# Patient Record
Sex: MALE | Race: WHITE
[De-identification: names, ages, dates, MRNs, and addresses within clinical notes are randomized per-mention and may not be internally consistent; named-entity substitution may affect disease eponyms.]

---

## 2018-04-23 ENCOUNTER — HOSPITAL ENCOUNTER (EMERGENCY)
Dept: HOSPITAL 17 - PHED | Age: 63
Discharge: HOME | End: 2018-04-23
Payer: OTHER GOVERNMENT

## 2018-04-23 VITALS
HEART RATE: 55 BPM | RESPIRATION RATE: 18 BRPM | DIASTOLIC BLOOD PRESSURE: 84 MMHG | SYSTOLIC BLOOD PRESSURE: 156 MMHG | OXYGEN SATURATION: 97 %

## 2018-04-23 VITALS
TEMPERATURE: 97.6 F | DIASTOLIC BLOOD PRESSURE: 107 MMHG | HEART RATE: 66 BPM | RESPIRATION RATE: 16 BRPM | SYSTOLIC BLOOD PRESSURE: 205 MMHG | OXYGEN SATURATION: 98 %

## 2018-04-23 VITALS
HEART RATE: 84 BPM | DIASTOLIC BLOOD PRESSURE: 103 MMHG | RESPIRATION RATE: 18 BRPM | OXYGEN SATURATION: 97 % | SYSTOLIC BLOOD PRESSURE: 168 MMHG

## 2018-04-23 VITALS — HEIGHT: 66 IN | WEIGHT: 173.28 LBS | BODY MASS INDEX: 27.85 KG/M2

## 2018-04-23 VITALS
OXYGEN SATURATION: 97 % | RESPIRATION RATE: 18 BRPM | DIASTOLIC BLOOD PRESSURE: 82 MMHG | SYSTOLIC BLOOD PRESSURE: 158 MMHG | HEART RATE: 57 BPM

## 2018-04-23 DIAGNOSIS — Z79.899: ICD-10-CM

## 2018-04-23 DIAGNOSIS — R94.31: ICD-10-CM

## 2018-04-23 DIAGNOSIS — R00.1: ICD-10-CM

## 2018-04-23 DIAGNOSIS — Z72.0: ICD-10-CM

## 2018-04-23 DIAGNOSIS — R42: ICD-10-CM

## 2018-04-23 DIAGNOSIS — E78.5: ICD-10-CM

## 2018-04-23 DIAGNOSIS — I10: Primary | ICD-10-CM

## 2018-04-23 DIAGNOSIS — E78.00: ICD-10-CM

## 2018-04-23 LAB
BASOPHILS # BLD AUTO: 0.1 TH/MM3 (ref 0–0.2)
BASOPHILS NFR BLD: 0.9 % (ref 0–2)
BUN SERPL-MCNC: 12 MG/DL (ref 7–18)
CALCIUM SERPL-MCNC: 8.9 MG/DL (ref 8.5–10.1)
CHLORIDE SERPL-SCNC: 103 MEQ/L (ref 98–107)
CREAT SERPL-MCNC: 0.96 MG/DL (ref 0.6–1.3)
EOSINOPHIL # BLD: 0.1 TH/MM3 (ref 0–0.4)
EOSINOPHIL NFR BLD: 0.9 % (ref 0–4)
ERYTHROCYTE [DISTWIDTH] IN BLOOD BY AUTOMATED COUNT: 13.9 % (ref 11.6–17.2)
GFR SERPLBLD BASED ON 1.73 SQ M-ARVRAT: 79 ML/MIN (ref 89–?)
GLUCOSE SERPL-MCNC: 92 MG/DL (ref 74–106)
HCO3 BLD-SCNC: 28.7 MEQ/L (ref 21–32)
HCT VFR BLD CALC: 48.6 % (ref 39–51)
HGB BLD-MCNC: 15.8 GM/DL (ref 13–17)
INR PPP: 1 RATIO
LYMPHOCYTES # BLD AUTO: 2.1 TH/MM3 (ref 1–4.8)
LYMPHOCYTES NFR BLD AUTO: 26.2 % (ref 9–44)
MAGNESIUM SERPL-MCNC: 2.1 MG/DL (ref 1.5–2.5)
MCH RBC QN AUTO: 27.7 PG (ref 27–34)
MCHC RBC AUTO-ENTMCNC: 32.6 % (ref 32–36)
MCV RBC AUTO: 85 FL (ref 80–100)
MONOCYTE #: 0.6 TH/MM3 (ref 0–0.9)
MONOCYTES NFR BLD: 7.1 % (ref 0–8)
NEUTROPHILS # BLD AUTO: 5.2 TH/MM3 (ref 1.8–7.7)
NEUTROPHILS NFR BLD AUTO: 64.9 % (ref 16–70)
PLATELET # BLD: 229 TH/MM3 (ref 150–450)
PMV BLD AUTO: 6.9 FL (ref 7–11)
PROTHROMBIN TIME: 10.6 SEC (ref 9.8–11.6)
RBC # BLD AUTO: 5.72 MIL/MM3 (ref 4.5–5.9)
SODIUM SERPL-SCNC: 136 MEQ/L (ref 136–145)
TROPONIN I SERPL-MCNC: (no result) NG/ML (ref 0.02–0.05)
WBC # BLD AUTO: 8.1 TH/MM3 (ref 4–11)

## 2018-04-23 PROCEDURE — 83690 ASSAY OF LIPASE: CPT

## 2018-04-23 PROCEDURE — 85730 THROMBOPLASTIN TIME PARTIAL: CPT

## 2018-04-23 PROCEDURE — 71045 X-RAY EXAM CHEST 1 VIEW: CPT

## 2018-04-23 PROCEDURE — 99285 EMERGENCY DEPT VISIT HI MDM: CPT

## 2018-04-23 PROCEDURE — 96360 HYDRATION IV INFUSION INIT: CPT

## 2018-04-23 PROCEDURE — 85025 COMPLETE CBC W/AUTO DIFF WBC: CPT

## 2018-04-23 PROCEDURE — 82550 ASSAY OF CK (CPK): CPT

## 2018-04-23 PROCEDURE — 84484 ASSAY OF TROPONIN QUANT: CPT

## 2018-04-23 PROCEDURE — 85610 PROTHROMBIN TIME: CPT

## 2018-04-23 PROCEDURE — 83735 ASSAY OF MAGNESIUM: CPT

## 2018-04-23 PROCEDURE — 80048 BASIC METABOLIC PNL TOTAL CA: CPT

## 2018-04-23 PROCEDURE — 93005 ELECTROCARDIOGRAM TRACING: CPT

## 2018-04-23 PROCEDURE — 70450 CT HEAD/BRAIN W/O DYE: CPT

## 2018-04-23 PROCEDURE — 82552 ASSAY OF CPK IN BLOOD: CPT

## 2018-04-23 NOTE — EKG
Date Performed: 04/23/2018       Time Performed: 20:55:10

 

PTAGE:      63 years

 

EKG:      SINUS BRADYCARDIA WITH SHORT OH INTERVAL LEFT ATRIAL ENLARGEMENT BORDERLINE LEFT AXIS DEVIA
TION LOW QRS VOLTAGE IN EXTREMITY LEADS ABNORMAL ECG 

 

NO PREVIOUS TRACING            

 

DOCTOR:   Rudolph Peterson  Interpretating Date/Time  04/23/2018 21:49:26

## 2018-04-23 NOTE — RADRPT
EXAM DATE/TIME:  04/23/2018 22:07 

 

[facility] COMPARISON:     

No previous studies available for comparison.

 

 

INDICATIONS :     

Dizziness.

                      

 

RADIATION DOSE:     

50.69 CTDIvol (mGy) 

 

 

 

MEDICAL HISTORY :     

Hypertension.  

 

SURGICAL HISTORY :      

None. 

 

ENCOUNTER:      

Initial

 

ACUITY:      

1 day

 

PAIN SCALE:      

0/10

 

LOCATION:        

cranial 

 

TECHNIQUE:     

Multiple contiguous axial images were obtained of the head.  Using automated exposure control and adj
ustment of the mA and/or kV according to patient size, radiation dose was kept as low as reasonably a
chievable to obtain optimal diagnostic quality images.   DICOM format image data is available electro
nically for review and comparison.  

 

FINDINGS:     

 

CEREBRUM:     

The ventricles are normal for age.  No evidence of midline shift, mass lesion, hemorrhage or acute in
farction.  No extra-axial fluid collections are seen.

 

POSTERIOR FOSSA:     

The cerebellum and brainstem are intact.  The 4th ventricle is midline.  The cerebellopontine angle i
s unremarkable.

 

EXTRACRANIAL:     

The visualized portion of the orbits is intact.

 

SKULL:     

The calvaria is intact.  No evidence of skull fracture.

 

CONCLUSION:     

Negative noncontrast head CT.

 

 

 

 Johnny Pacheco MD on April 23, 2018 at 22:29           

Board Certified Radiologist.

 This report was verified electronically.

## 2018-04-23 NOTE — PD
HPI


Chief Complaint:  Hypertension


Time Seen by Provider:  20:03


Travel History


International Travel<30 days:  No


Contact w/Intl Traveler<30days:  No


Traveled to known affect area:  No





History of Present Illness


HPI


Patient is a 63-year-old male who presents the emergency room for evaluation of 

dizziness.  Patient reports that he has history of hypertension as well as 

hyperlipidemia, patient reports that he is here visiting from [place].  

Patient reports that he takes lisinopril/hydrochlorothiazide 5 mg daily, 

reports that he was lying down today and all of a sudden felt dizzy.  Patient 

checked his blood pressure and noticed that it was high, reports that it was 174

/107.  Patient reports that he thought that his symptoms were due to his blood 

pressure being elevated.  Patient reports that he has been compliant with his 

medications.  Patient denies any vision changes, denies any nausea vomiting 

with the symptoms.  Patient with no chest pain or shortness of breath.  Patient 

reports that he feels slightly lightheaded, he does not feel ataxic.  Patient 

endorses that the only meal he had today was breakfast, he does only eat one 

meal per day as he likes to be thin.





Formerly [facility]


Past Medical History


High Cholesterol:  Yes


Hypertension:  Yes


Pregnant?:  Not Pregnant





Past Surgical History


Surgical History:  No Previous Surgery





Social History


Alcohol Use:  Yes


Tobacco Use:  Yes


Substance Use:  No





Allergies-Medications


(Allergen,Severity, Reaction):  


Coded Allergies:  


     No Known Allergies (Unverified , 4/23/18)


Reported Meds & Prescriptions





Reported Meds & Active Scripts


Active


Reported


Simvastatin 10 Mg Tab 10 Mg PO DAILY


Lisinopril-Hctz 10-12.5 Mg Tab 0.5 Tab PO DAILY








Review of Systems


General / Constitutional:  No: Fever


Eyes:  No: Visual changes


HENT:  Positive: Lightheadedness, No: Headaches, Neck Pain


Cardiovascular:  No: Chest Pain or Discomfort, Palpitations, Irregular Rhythm, 

Tachycardia, Diaphoresis


Respiratory:  No: Shortness of Breath


Gastrointestinal:  No: Abdominal Pain


Genitourinary:  No: Dysuria


Musculoskeletal:  No: Pain


Skin:  No Rash


Neurologic:  Positive: Dizziness, No: Weakness, Syncope


Psychiatric:  No: Depression


Endocrine:  No: Polydipsia


Hematologic/Lymphatic:  No: Easy Bruising





Physical Exam


Narrative


GENERAL: NAD


SKIN: Focused skin assessment warm/dry.


HEAD: Atraumatic. Normocephalic. 


EYES: Pupils equal and round. No scleral icterus. No injection or drainage. 


ENT: No nasal bleeding or discharge.  Mucous membranes pink and moist.


NECK: Trachea midline. No JVD. 


CARDIOVASCULAR: Regular rate and rhythm.  No murmur appreciated.


RESPIRATORY: No accessory muscle use. Clear to auscultation. Breath sounds 

equal bilaterally. 


GASTROINTESTINAL: Abdomen soft, non-tender, nondistended. Hepatic and splenic 

margins not palpable. 


MUSCULOSKELETAL: No obvious deformities. No clubbing.  No cyanosis.  No edema. 


NEUROLOGICAL: Awake and alert. No obvious cranial nerve deficits.  Motor 

grossly within normal limits. Normal speech. CN 2-12 grossly intact with no 

neurological deficits


PSYCHIATRIC: Appropriate mood and affect; insight and judgment normal.





Data


Data


Last Documented VS





Vital Signs








  Date Time  Temp Pulse Resp B/P (MAP) Pulse Ox O2 Delivery O2 Flow Rate FiO2


 


4/23/18 21:27  57 18 158/82 (107) 97 Room Air  


 


4/23/18 17:21 97.6       








Orders





 Orders


Electrocardiogram (4/23/18 20:11)


Basic Metabolic Panel (Bmp) (4/23/18 20:11)


Ckmb (Isoenzyme) Profile (4/23/18 20:11)


Complete Blood Count With Diff (4/23/18 20:11)


Magnesium (Mg) (4/23/18 20:11)


Prothrombin Time / Inr (Pt) (4/23/18 20:11)


Act Partial Throm Time (Ptt) (4/23/18 20:11)


Troponin I (4/23/18 20:11)


Lipase (4/23/18 20:11)


Chest, Single Ap (4/23/18 20:11)


Ecg Monitoring (4/23/18 20:11)


Iv Access Insert/Monitor (4/23/18 20:11)


Oximetry (4/23/18 20:11)


Sodium Chloride 0.9% Flush (Ns Flush) (4/23/18 20:15)


Sodium Chlorid 0.9% 500 Ml Inj (Ns 500 M (4/23/18 20:15)


Ct Brain W/O Iv Contrast(Rout) (4/23/18 20:11)


CKMB (4/23/18 20:30)


CKMB% (4/23/18 20:30)


Hydralazine Inj (Apresoline Inj) (4/23/18 21:30)


Electrocardiogram (4/23/18 )





Labs





Laboratory Tests








Test


  4/23/18


20:30


 


White Blood Count 8.1 TH/MM3 


 


Red Blood Count 5.72 MIL/MM3 


 


Hemoglobin 15.8 GM/DL 


 


Hematocrit 48.6 % 


 


Mean Corpuscular Volume 85.0 FL 


 


Mean Corpuscular Hemoglobin 27.7 PG 


 


Mean Corpuscular Hemoglobin


Concent 32.6 % 


 


 


Red Cell Distribution Width 13.9 % 


 


Platelet Count 229 TH/MM3 


 


Mean Platelet Volume 6.9 FL 


 


Neutrophils (%) (Auto) 64.9 % 


 


Lymphocytes (%) (Auto) 26.2 % 


 


Monocytes (%) (Auto) 7.1 % 


 


Eosinophils (%) (Auto) 0.9 % 


 


Basophils (%) (Auto) 0.9 % 


 


Neutrophils # (Auto) 5.2 TH/MM3 


 


Lymphocytes # (Auto) 2.1 TH/MM3 


 


Monocytes # (Auto) 0.6 TH/MM3 


 


Eosinophils # (Auto) 0.1 TH/MM3 


 


Basophils # (Auto) 0.1 TH/MM3 


 


CBC Comment DIFF FINAL 


 


Differential Comment  


 


Prothrombin Time 10.6 SEC 


 


Prothromb Time International


Ratio 1.0 RATIO 


 


 


Activated Partial


Thromboplast Time 25.9 SEC 


 


 


Blood Urea Nitrogen 12 MG/DL 


 


Creatinine 0.96 MG/DL 


 


Random Glucose 92 MG/DL 


 


Calcium Level 8.9 MG/DL 


 


Magnesium Level 2.1 MG/DL 


 


Sodium Level 136 MEQ/L 


 


Potassium Level 4.0 MEQ/L 


 


Chloride Level 103 MEQ/L 


 


Carbon Dioxide Level 28.7 MEQ/L 


 


Anion Gap 4 MEQ/L 


 


Estimat Glomerular Filtration


Rate 79 ML/MIN 


 


 


Total Creatine Kinase 119 U/L 


 


Creatine Kinase MB 2.1 NG/ML 


 


Troponin I


  LESS THAN 0.02


NG/ML


 


Lipase 141 U/L 











MDM


Medical Decision Making


Medical Screen Exam Complete:  Yes


Emergency Medical Condition:  Yes


Medical Record Reviewed:  Yes


Interpretation(s)


EKG at 2135: Sinus jorje at 55bpm, qt/qtc: 408/396, no acute st or t wave 

changes





Vital Signs








  Date Time  Temp Pulse Resp B/P (MAP) Pulse Ox O2 Delivery O2 Flow Rate FiO2


 


4/23/18 17:21 97.6 66 16 205/107 (139) 98   








Differential Diagnosis


Accelerated hypertension, hypertensive emergency, electrolyte abnormality, ich


Narrative Course


Patient is a 63-year-old male who presents the emergency room for evaluation of 

hypertension.  Patient does take lisinopril/hydrochlorothiazide 5 mg daily, 

reports that he noticed his blood pressure a little bit elevated today and felt 

dizzy.





Vital Signs








  Date Time  Temp Pulse Resp B/P (MAP) Pulse Ox O2 Delivery O2 Flow Rate FiO2


 


4/23/18 21:27  57 18 158/82 (107) 97 Room Air  


 


4/23/18 20:15  84 18 168/103 (124) 97 Room Air  


 


4/23/18 20:15     97   


 


4/23/18 20:15  84   97 Room Air  


 


4/23/18 17:21 97.6 66 16 205/107 (139) 98   





CBC & BMP Diagram


4/23/18 20:30








Calcium Level 8.9, Magnesium Level 2.1





Patient was monitored, repeat blood pressure was 158/82.  Patient reports that 

he is feeling much better this time.  Discussed with patient need to take his 

full dose of lisinopril as he has been taking half of his dose of lisinopril/

hydrochlorothiazide 10 mg.  Patient will follow up with his primary care doctor

, he will return to emergency room as needed. Patient thankful for care. I also 

encouraged patient to eat more than 1 meal a day as he only ate breakfast today 

and this could also be the source of his dizziness.





Diagnosis





 Primary Impression:  


 Hypertension


 Qualified Codes:  I10 - Essential (primary) hypertension


 Additional Impression:  


 Dizziness


Patient Instructions:  General Instructions





***Additional Instructions:  


**Please provide patient with a copy of their lab work and studies at discharge*

*





Please follow up with your primary care doctor in 2-3 days





Return to the ER if symptoms worsen or progress





Return to the ER as needed


Disposition:  01 DISCHARGE HOME


Condition:  Stable











Erin Sheikh DO Apr 23, 2018 20:24

## 2018-04-23 NOTE — RADRPT
EXAM DATE/TIME:  04/23/2018 20:27 

 

[facility] COMPARISON:     

No previous studies available for comparison.

 

                     

INDICATIONS :     

Syncopal episode.

                     

 

MEDICAL HISTORY :     

Hypertension.          

 

SURGICAL HISTORY :     

None.   

 

ENCOUNTER:     

Initial                                        

 

ACUITY:     

1 day      

 

PAIN SCORE:     

0/10

 

LOCATION:     

Bilateral chest 

 

FINDINGS:     

A single view of the chest demonstrates the lungs to be symmetrically aerated without evidence of mas
s, infiltrate or effusion.  The cardiomediastinal contours are unremarkable.  Osseous structures are 
intact.

 

CONCLUSION:     

Negative one view chest x-ray.

 

 

 

 Johnny Pacheco MD on April 23, 2018 at 20:50           

Board Certified Radiologist.

 This report was verified electronically.

## 2018-04-24 NOTE — EKG
Date Performed: 04/23/2018       Time Performed: 21:35:30

 

PTAGE:      63 years

 

EKG:      SINUS BRADYCARDIA BORDERLINE ECG

 

PREVIOUS TRACING       : 04/23/2018 20.55 No significant change from previous tracing noted.

 

DOCTOR:   Rudolph Peterson  Interpretating Date/Time  04/24/2018 05:25:51